# Patient Record
Sex: MALE | Race: OTHER | HISPANIC OR LATINO
[De-identification: names, ages, dates, MRNs, and addresses within clinical notes are randomized per-mention and may not be internally consistent; named-entity substitution may affect disease eponyms.]

---

## 2022-04-29 PROBLEM — Z00.00 ENCOUNTER FOR PREVENTIVE HEALTH EXAMINATION: Status: ACTIVE | Noted: 2022-04-29

## 2022-05-02 ENCOUNTER — APPOINTMENT (OUTPATIENT)
Dept: GASTROENTEROLOGY | Facility: CLINIC | Age: 22
End: 2022-05-02
Payer: COMMERCIAL

## 2022-05-02 ENCOUNTER — NON-APPOINTMENT (OUTPATIENT)
Age: 22
End: 2022-05-02

## 2022-05-02 VITALS
HEART RATE: 71 BPM | OXYGEN SATURATION: 98 % | TEMPERATURE: 93.2 F | SYSTOLIC BLOOD PRESSURE: 108 MMHG | DIASTOLIC BLOOD PRESSURE: 62 MMHG | BODY MASS INDEX: 20.89 KG/M2 | HEIGHT: 66 IN | WEIGHT: 130 LBS

## 2022-05-02 PROCEDURE — 36415 COLL VENOUS BLD VENIPUNCTURE: CPT

## 2022-05-02 PROCEDURE — 99203 OFFICE O/P NEW LOW 30 MIN: CPT | Mod: 25

## 2022-05-02 NOTE — HISTORY OF PRESENT ILLNESS
[FreeTextEntry1] : 21m no sig PMH, here for 1mo hx LLQ discomfort.  Occ LUQ, suprapubic, RLQ, but mostly LLQ. Sx daily mild and wax/wane w/o pattern or relation to food intake or BMs.  He does now have 3 formed BMs/day - this is new.  Minimal urgency, no diarrhea.  No fever, wt loss.  No brbpr, melena.  \par \par Tested/treated/cleared H. pylori at PMD, sx unchanged.\par Sx severe enough to prompt [facility] ER visit  - - was told labs, sono all unremarkable.\par \par Soc:  no tobacco or significant EtOH\par FHx: no FHx GI malignancy or IBD; +++IBS in family.\par \par ROS:\par Constitutional:: no weight loss, fevers\par ENT: no deafness\par Eyes: not blind\par Neck: no LN\par Chest: no dyspnea/cough\par Cardiac: no chest pain\par Vascular: no leg swelling\par GI: no abdominal pain, nausea, vomiting, diarrhea, constipation, rectal bleeding, dysphagia, melena unless otherwise noted in HPI\par : no dysuria, dark urine\par Skin: no rashes, jaundice\par Heme: no bleeding\par Endocrine: no DM unless otherwise stated in HPI\par \par Px: (VS noted below)\par General: NAD\par Eyes: anicteric\par Oropharynx:  clear\par Neck: no LN\par Chest: normal respiratory effort\par CVS: regular\par Abd: soft, NT, ND, +BS, no HSM\par Ext: no atrophy\par Neuro: grossly nonfocal\par \par Labs/imaging/prior endoscopic results reviewed to the extent available and noted in HPI\par

## 2022-05-02 NOTE — REASON FOR VISIT
[Consultation] : a consultation visit [FreeTextEntry1] : Kindly asked by Eva Helton  to consult and evaluate patient for      abdominal pain     \par A copy of this note is being sent to physician requesting consultation.

## 2022-05-02 NOTE — ASSESSMENT
[FreeTextEntry1] : -unclear etiology - likely functional; doubt diverticulitis w/ age/migratory localization; can't completely r/o IBD.  Will check labs as ordered, start fiber supplements, and will obtain outside [facility] w/u for review.  Reasses 6wks.  \par \par PMD/consultation/hospital notes and Labs/imaging/prior endoscopic results reviewed to extent noted in HPI; and, if procedure code billed on this visit for lab draw, this serves to signify that labs were drawn here in this office.\par

## 2022-05-02 NOTE — CONSULT LETTER
[FreeTextEntry1] : Dear Dr. JOY MOSS ,\par \par I had the pleasure of evaluating your patient,  KRISTY MONTIEL.\par \par Please refer to my note below.\par \par Thank you very much for allowing me to participate in the care of this patient.  If you have any questions, please do not hesitate to contact me.\par \par Sincerely, \par \par Beto Yadav MD\par

## 2022-05-03 ENCOUNTER — TRANSCRIPTION ENCOUNTER (OUTPATIENT)
Age: 22
End: 2022-05-03

## 2022-05-03 LAB
ALBUMIN SERPL ELPH-MCNC: 4.9 G/DL
ALP BLD-CCNC: 96 U/L
ALT SERPL-CCNC: 15 U/L
ANION GAP SERPL CALC-SCNC: 13 MMOL/L
AST SERPL-CCNC: 18 U/L
BASOPHILS # BLD AUTO: 0.07 K/UL
BASOPHILS NFR BLD AUTO: 1.6 %
BILIRUB SERPL-MCNC: 0.6 MG/DL
BUN SERPL-MCNC: 7 MG/DL
CALCIUM SERPL-MCNC: 9.6 MG/DL
CHLORIDE SERPL-SCNC: 103 MMOL/L
CO2 SERPL-SCNC: 25 MMOL/L
CREAT SERPL-MCNC: 0.79 MG/DL
EGFR: 130 ML/MIN/1.73M2
EOSINOPHIL # BLD AUTO: 0.09 K/UL
EOSINOPHIL NFR BLD AUTO: 2 %
GLUCOSE SERPL-MCNC: 92 MG/DL
HCT VFR BLD CALC: 45.6 %
HGB BLD-MCNC: 14.7 G/DL
IGA SER QL IEP: 182 MG/DL
IMM GRANULOCYTES NFR BLD AUTO: 0.4 %
LPL SERPL-CCNC: 19 U/L
LYMPHOCYTES # BLD AUTO: 1.38 K/UL
LYMPHOCYTES NFR BLD AUTO: 30.7 %
MAN DIFF?: NORMAL
MCHC RBC-ENTMCNC: 29.6 PG
MCHC RBC-ENTMCNC: 32.2 GM/DL
MCV RBC AUTO: 91.9 FL
MONOCYTES # BLD AUTO: 0.33 K/UL
MONOCYTES NFR BLD AUTO: 7.3 %
NEUTROPHILS # BLD AUTO: 2.6 K/UL
NEUTROPHILS NFR BLD AUTO: 58 %
PLATELET # BLD AUTO: 141 K/UL
POTASSIUM SERPL-SCNC: 4.9 MMOL/L
PROT SERPL-MCNC: 7.2 G/DL
RBC # BLD: 4.96 M/UL
RBC # FLD: 13.1 %
SODIUM SERPL-SCNC: 141 MMOL/L
TTG IGA SER IA-ACNC: <1.2 U/ML
TTG IGA SER-ACNC: NEGATIVE
TTG IGG SER IA-ACNC: <1.2 U/ML
TTG IGG SER IA-ACNC: NEGATIVE
WBC # FLD AUTO: 4.49 K/UL

## 2022-07-08 ENCOUNTER — APPOINTMENT (OUTPATIENT)
Dept: GASTROENTEROLOGY | Facility: CLINIC | Age: 22
End: 2022-07-08

## 2022-07-08 VITALS
HEIGHT: 66 IN | WEIGHT: 125 LBS | BODY MASS INDEX: 20.09 KG/M2 | OXYGEN SATURATION: 98 % | TEMPERATURE: 98.4 F | DIASTOLIC BLOOD PRESSURE: 60 MMHG | HEART RATE: 75 BPM | SYSTOLIC BLOOD PRESSURE: 106 MMHG

## 2022-07-08 DIAGNOSIS — R10.32 LEFT LOWER QUADRANT PAIN: ICD-10-CM

## 2022-07-08 DIAGNOSIS — N20.0 CALCULUS OF KIDNEY: ICD-10-CM

## 2022-07-08 PROCEDURE — 99214 OFFICE O/P EST MOD 30 MIN: CPT

## 2022-07-08 NOTE — HISTORY OF PRESENT ILLNESS
[FreeTextEntry1] : 21m no sig PMH, here for f/u abd pain.  \par \par Saw me 5/2022 for several mo hx LLQ discomfort.  Occ LUQ, suprapubic, RLQ, but mostly LLQ. Sx daily mild and wax/wane w/o pattern or relation to food intake or BMs.  3 formed BMs/day - this is new.  Minimal urgency, no diarrhea.  No fever, wt loss.  No brbpr, melena.  \par \par Tested/treated/cleared H. pylori at PMD, sx unchanged.\par Sx severe enough to prompt [facility] ER visit  3/2022 - nonobstructing stone on  CT and normal US renal and RLQ; RUQ not done. Unremarkable labs (exc mild low plats).\par \par 5/2022 labs w/ me - normal cbc, cmet, celiac labs exc plats 140s\par \par Started fiber supplement at last visit.\par \par Symptoms resolved.\par \par Soc:  no tobacco or significant EtOH\par FHx: no FHx GI malignancy or IBD; +++IBS in family.\par \par ROS:\par Constitutional:: no weight loss, fevers\par ENT: no deafness\par Eyes: not blind\par Neck: no LN\par Chest: no dyspnea/cough\par Cardiac: no chest pain\par Vascular: no leg swelling\par GI: no abdominal pain, nausea, vomiting, diarrhea, constipation, rectal bleeding, dysphagia, melena unless otherwise noted in HPI\par : no dysuria, dark urine\par Skin: no rashes, jaundice\par Heme: no bleeding\par Endocrine: no DM unless otherwise stated in HPI\par \par Px: (VS noted below)\par General: NAD\par Eyes: anicteric\par Oropharynx:  clear\par Neck: no LN\par Chest: normal respiratory effort\par CVS: regular\par Abd: soft, NT, ND, +BS, no HSM\par Ext: no atrophy\par Neuro: grossly nonfocal\par \par Labs/imaging/prior endoscopic results reviewed to the extent available and noted in HPI\par
